# Patient Record
Sex: MALE | Race: BLACK OR AFRICAN AMERICAN | Employment: FULL TIME | URBAN - METROPOLITAN AREA
[De-identification: names, ages, dates, MRNs, and addresses within clinical notes are randomized per-mention and may not be internally consistent; named-entity substitution may affect disease eponyms.]

---

## 2024-08-19 ENCOUNTER — APPOINTMENT (OUTPATIENT)
Dept: GENERAL RADIOLOGY | Age: 59
DRG: 305 | End: 2024-08-19
Payer: COMMERCIAL

## 2024-08-19 ENCOUNTER — HOSPITAL ENCOUNTER (INPATIENT)
Age: 59
LOS: 2 days | Discharge: HOME | DRG: 305 | End: 2024-08-21
Attending: EMERGENCY MEDICINE | Admitting: STUDENT IN AN ORGANIZED HEALTH CARE EDUCATION/TRAINING PROGRAM
Payer: COMMERCIAL

## 2024-08-19 ENCOUNTER — APPOINTMENT (OUTPATIENT)
Dept: CT IMAGING | Age: 59
DRG: 305 | End: 2024-08-19
Payer: COMMERCIAL

## 2024-08-19 DIAGNOSIS — I16.1 HYPERTENSIVE EMERGENCY: ICD-10-CM

## 2024-08-19 DIAGNOSIS — N17.9 ACUTE KIDNEY INJURY SUPERIMPOSED ON CKD (HCC): Primary | ICD-10-CM

## 2024-08-19 DIAGNOSIS — N18.9 ACUTE KIDNEY INJURY SUPERIMPOSED ON CKD (HCC): Primary | ICD-10-CM

## 2024-08-19 LAB
ALBUMIN SERPL-MCNC: 4.3 G/DL (ref 3.5–4.6)
ALP SERPL-CCNC: 107 U/L (ref 35–104)
ALT SERPL-CCNC: 31 U/L (ref 0–41)
ANION GAP SERPL CALCULATED.3IONS-SCNC: 12 MEQ/L (ref 9–15)
ANION GAP SERPL CALCULATED.3IONS-SCNC: 14 MEQ/L (ref 9–15)
APTT PPP: 27.5 SEC (ref 24.4–36.8)
AST SERPL-CCNC: 45 U/L (ref 0–40)
BACTERIA URNS QL MICRO: NEGATIVE /HPF
BASOPHILS # BLD: 0 K/UL (ref 0–0.2)
BASOPHILS NFR BLD: 0.6 %
BILIRUB SERPL-MCNC: 1.1 MG/DL (ref 0.2–0.7)
BILIRUB UR QL STRIP: NEGATIVE
BNP BLD-MCNC: 83 PG/ML
BUN SERPL-MCNC: 29 MG/DL (ref 8–23)
BUN SERPL-MCNC: 31 MG/DL (ref 8–23)
CALCIUM SERPL-MCNC: 9.1 MG/DL (ref 8.5–9.9)
CALCIUM SERPL-MCNC: 9.3 MG/DL (ref 8.5–9.9)
CHLORIDE SERPL-SCNC: 103 MEQ/L (ref 95–107)
CHLORIDE SERPL-SCNC: 98 MEQ/L (ref 95–107)
CLARITY UR: CLEAR
CO2 SERPL-SCNC: 26 MEQ/L (ref 20–31)
CO2 SERPL-SCNC: 32 MEQ/L (ref 20–31)
COLOR UR: YELLOW
CREAT SERPL-MCNC: 2.45 MG/DL (ref 0.7–1.2)
CREAT SERPL-MCNC: 2.47 MG/DL (ref 0.7–1.2)
EKG ATRIAL RATE: 85 BPM
EKG P AXIS: 64 DEGREES
EKG P-R INTERVAL: 158 MS
EKG Q-T INTERVAL: 382 MS
EKG QRS DURATION: 88 MS
EKG QTC CALCULATION (BAZETT): 454 MS
EKG R AXIS: -24 DEGREES
EKG T AXIS: 30 DEGREES
EKG VENTRICULAR RATE: 85 BPM
EOSINOPHIL # BLD: 0.6 K/UL (ref 0–0.7)
EOSINOPHIL NFR BLD: 8.8 %
EPI CELLS #/AREA URNS HPF: NORMAL /HPF
ERYTHROCYTE [DISTWIDTH] IN BLOOD BY AUTOMATED COUNT: 12.9 % (ref 11.5–14.5)
GLOBULIN SER CALC-MCNC: 3 G/DL (ref 2.3–3.5)
GLUCOSE BLD-MCNC: 131 MG/DL (ref 70–99)
GLUCOSE SERPL-MCNC: 101 MG/DL (ref 70–99)
GLUCOSE SERPL-MCNC: 115 MG/DL (ref 70–99)
GLUCOSE UR STRIP-MCNC: NEGATIVE MG/DL
HCT VFR BLD AUTO: 43.9 % (ref 42–52)
HGB BLD-MCNC: 15.1 G/DL (ref 14–18)
HGB UR QL STRIP: NEGATIVE
INR PPP: 1.1
KETONES UR STRIP-MCNC: NEGATIVE MG/DL
LEUKOCYTE ESTERASE UR QL STRIP: NEGATIVE
LYMPHOCYTES # BLD: 2.1 K/UL (ref 1–4.8)
LYMPHOCYTES NFR BLD: 30.6 %
MAGNESIUM SERPL-MCNC: 2.1 MG/DL (ref 1.7–2.4)
MAGNESIUM SERPL-MCNC: 2.2 MG/DL (ref 1.7–2.4)
MCH RBC QN AUTO: 30.1 PG (ref 27–31.3)
MCHC RBC AUTO-ENTMCNC: 34.4 % (ref 33–37)
MCV RBC AUTO: 87.5 FL (ref 79–92.2)
MONOCYTES # BLD: 0.5 K/UL (ref 0.2–0.8)
MONOCYTES NFR BLD: 7.6 %
NEUTROPHILS # BLD: 3.6 K/UL (ref 1.4–6.5)
NEUTS SEG NFR BLD: 52.3 %
NITRITE UR QL STRIP: NEGATIVE
PERFORMED ON: ABNORMAL
PH UR STRIP: 6 [PH] (ref 5–9)
PLATELET # BLD AUTO: 197 K/UL (ref 130–400)
POTASSIUM SERPL-SCNC: 3 MEQ/L (ref 3.4–4.9)
POTASSIUM SERPL-SCNC: 3.1 MEQ/L (ref 3.4–4.9)
PROT SERPL-MCNC: 7.3 G/DL (ref 6.3–8)
PROT UR STRIP-MCNC: 100 MG/DL
PROTHROMBIN TIME: 13.9 SEC (ref 12.3–14.9)
RBC # BLD AUTO: 5.02 M/UL (ref 4.7–6.1)
RBC #/AREA URNS HPF: NORMAL /HPF (ref 0–2)
SODIUM SERPL-SCNC: 142 MEQ/L (ref 135–144)
SODIUM SERPL-SCNC: 143 MEQ/L (ref 135–144)
SP GR UR STRIP: 1.01 (ref 1–1.03)
TROPONIN, HIGH SENSITIVITY: 16 NG/L (ref 0–19)
TROPONIN, HIGH SENSITIVITY: 17 NG/L (ref 0–19)
TROPONIN, HIGH SENSITIVITY: 17 NG/L (ref 0–19)
URINE REFLEX TO CULTURE: ABNORMAL
UROBILINOGEN UR STRIP-ACNC: 1 E.U./DL
WBC # BLD AUTO: 6.8 K/UL (ref 4.8–10.8)
WBC #/AREA URNS HPF: NORMAL /HPF (ref 0–5)

## 2024-08-19 PROCEDURE — 96374 THER/PROPH/DIAG INJ IV PUSH: CPT

## 2024-08-19 PROCEDURE — 84484 ASSAY OF TROPONIN QUANT: CPT

## 2024-08-19 PROCEDURE — 93005 ELECTROCARDIOGRAM TRACING: CPT | Performed by: INTERNAL MEDICINE

## 2024-08-19 PROCEDURE — 85610 PROTHROMBIN TIME: CPT

## 2024-08-19 PROCEDURE — 71045 X-RAY EXAM CHEST 1 VIEW: CPT

## 2024-08-19 PROCEDURE — 85730 THROMBOPLASTIN TIME PARTIAL: CPT

## 2024-08-19 PROCEDURE — 6370000000 HC RX 637 (ALT 250 FOR IP): Performed by: STUDENT IN AN ORGANIZED HEALTH CARE EDUCATION/TRAINING PROGRAM

## 2024-08-19 PROCEDURE — 93010 ELECTROCARDIOGRAM REPORT: CPT | Performed by: INTERNAL MEDICINE

## 2024-08-19 PROCEDURE — 96375 TX/PRO/DX INJ NEW DRUG ADDON: CPT

## 2024-08-19 PROCEDURE — 36415 COLL VENOUS BLD VENIPUNCTURE: CPT

## 2024-08-19 PROCEDURE — 83735 ASSAY OF MAGNESIUM: CPT

## 2024-08-19 PROCEDURE — 81001 URINALYSIS AUTO W/SCOPE: CPT

## 2024-08-19 PROCEDURE — 6360000002 HC RX W HCPCS: Performed by: EMERGENCY MEDICINE

## 2024-08-19 PROCEDURE — 70450 CT HEAD/BRAIN W/O DYE: CPT

## 2024-08-19 PROCEDURE — 6370000000 HC RX 637 (ALT 250 FOR IP): Performed by: NURSE PRACTITIONER

## 2024-08-19 PROCEDURE — 6370000000 HC RX 637 (ALT 250 FOR IP): Performed by: EMERGENCY MEDICINE

## 2024-08-19 PROCEDURE — 85025 COMPLETE CBC W/AUTO DIFF WBC: CPT

## 2024-08-19 PROCEDURE — 2580000003 HC RX 258: Performed by: EMERGENCY MEDICINE

## 2024-08-19 PROCEDURE — 99285 EMERGENCY DEPT VISIT HI MDM: CPT

## 2024-08-19 PROCEDURE — 83880 ASSAY OF NATRIURETIC PEPTIDE: CPT

## 2024-08-19 PROCEDURE — 2000000000 HC ICU R&B

## 2024-08-19 PROCEDURE — 80053 COMPREHEN METABOLIC PANEL: CPT

## 2024-08-19 PROCEDURE — 93005 ELECTROCARDIOGRAM TRACING: CPT | Performed by: EMERGENCY MEDICINE

## 2024-08-19 PROCEDURE — 99291 CRITICAL CARE FIRST HOUR: CPT | Performed by: INTERNAL MEDICINE

## 2024-08-19 RX ORDER — SODIUM CHLORIDE 0.9 % (FLUSH) 0.9 %
5-40 SYRINGE (ML) INJECTION EVERY 12 HOURS SCHEDULED
Status: DISCONTINUED | OUTPATIENT
Start: 2024-08-19 | End: 2024-08-21 | Stop reason: HOSPADM

## 2024-08-19 RX ORDER — LOSARTAN POTASSIUM 50 MG/1
25 TABLET ORAL DAILY
Status: DISCONTINUED | OUTPATIENT
Start: 2024-08-19 | End: 2024-08-19

## 2024-08-19 RX ORDER — CHLORTHALIDONE 25 MG/1
12.5 TABLET ORAL DAILY
Status: DISCONTINUED | OUTPATIENT
Start: 2024-08-19 | End: 2024-08-21 | Stop reason: HOSPADM

## 2024-08-19 RX ORDER — ENOXAPARIN SODIUM 100 MG/ML
40 INJECTION SUBCUTANEOUS DAILY
Status: DISCONTINUED | OUTPATIENT
Start: 2024-08-19 | End: 2024-08-21 | Stop reason: HOSPADM

## 2024-08-19 RX ORDER — POTASSIUM CHLORIDE 7.45 MG/ML
10 INJECTION INTRAVENOUS PRN
Status: DISCONTINUED | OUTPATIENT
Start: 2024-08-19 | End: 2024-08-21 | Stop reason: HOSPADM

## 2024-08-19 RX ORDER — MAGNESIUM SULFATE IN WATER 40 MG/ML
2000 INJECTION, SOLUTION INTRAVENOUS PRN
Status: DISCONTINUED | OUTPATIENT
Start: 2024-08-19 | End: 2024-08-21 | Stop reason: HOSPADM

## 2024-08-19 RX ORDER — POTASSIUM CHLORIDE 20 MEQ/1
20 TABLET, EXTENDED RELEASE ORAL DAILY
COMMUNITY

## 2024-08-19 RX ORDER — HYDRALAZINE HYDROCHLORIDE 20 MG/ML
20 INJECTION INTRAMUSCULAR; INTRAVENOUS EVERY 4 HOURS PRN
Status: DISCONTINUED | OUTPATIENT
Start: 2024-08-19 | End: 2024-08-21 | Stop reason: HOSPADM

## 2024-08-19 RX ORDER — LABETALOL HYDROCHLORIDE 5 MG/ML
20 INJECTION, SOLUTION INTRAVENOUS EVERY 4 HOURS PRN
Status: DISCONTINUED | OUTPATIENT
Start: 2024-08-19 | End: 2024-08-20

## 2024-08-19 RX ORDER — HYDRALAZINE HYDROCHLORIDE 20 MG/ML
5 INJECTION INTRAMUSCULAR; INTRAVENOUS ONCE
Status: DISCONTINUED | OUTPATIENT
Start: 2024-08-19 | End: 2024-08-19

## 2024-08-19 RX ORDER — SODIUM CHLORIDE 0.9 % (FLUSH) 0.9 %
5-40 SYRINGE (ML) INJECTION PRN
Status: DISCONTINUED | OUTPATIENT
Start: 2024-08-19 | End: 2024-08-21 | Stop reason: HOSPADM

## 2024-08-19 RX ORDER — LOSARTAN POTASSIUM 50 MG/1
50 TABLET ORAL DAILY
Status: DISCONTINUED | OUTPATIENT
Start: 2024-08-20 | End: 2024-08-21 | Stop reason: HOSPADM

## 2024-08-19 RX ORDER — ONDANSETRON 2 MG/ML
4 INJECTION INTRAMUSCULAR; INTRAVENOUS EVERY 6 HOURS PRN
Status: DISCONTINUED | OUTPATIENT
Start: 2024-08-19 | End: 2024-08-21 | Stop reason: HOSPADM

## 2024-08-19 RX ORDER — LOSARTAN POTASSIUM 50 MG/1
25 TABLET ORAL ONCE
Status: COMPLETED | OUTPATIENT
Start: 2024-08-19 | End: 2024-08-19

## 2024-08-19 RX ORDER — HYDROCHLOROTHIAZIDE 12.5 MG/1
12.5 TABLET ORAL DAILY
Status: ON HOLD | COMMUNITY
End: 2024-08-19

## 2024-08-19 RX ORDER — SODIUM CHLORIDE 9 MG/ML
INJECTION, SOLUTION INTRAVENOUS PRN
Status: DISCONTINUED | OUTPATIENT
Start: 2024-08-19 | End: 2024-08-21 | Stop reason: HOSPADM

## 2024-08-19 RX ORDER — CHLORTHALIDONE 25 MG/1
12.5 TABLET ORAL DAILY
COMMUNITY

## 2024-08-19 RX ORDER — TRAZODONE HYDROCHLORIDE 50 MG/1
50 TABLET ORAL NIGHTLY
COMMUNITY

## 2024-08-19 RX ORDER — ACETAMINOPHEN 650 MG/1
650 SUPPOSITORY RECTAL EVERY 6 HOURS PRN
Status: DISCONTINUED | OUTPATIENT
Start: 2024-08-19 | End: 2024-08-21 | Stop reason: HOSPADM

## 2024-08-19 RX ORDER — LOSARTAN POTASSIUM 25 MG/1
25 TABLET ORAL DAILY
Status: ON HOLD | COMMUNITY
End: 2024-08-21 | Stop reason: HOSPADM

## 2024-08-19 RX ORDER — HYDRALAZINE HYDROCHLORIDE 20 MG/ML
10 INJECTION INTRAMUSCULAR; INTRAVENOUS ONCE
Status: COMPLETED | OUTPATIENT
Start: 2024-08-19 | End: 2024-08-19

## 2024-08-19 RX ORDER — ONDANSETRON 4 MG/1
4 TABLET, ORALLY DISINTEGRATING ORAL EVERY 8 HOURS PRN
Status: DISCONTINUED | OUTPATIENT
Start: 2024-08-19 | End: 2024-08-21 | Stop reason: HOSPADM

## 2024-08-19 RX ORDER — TRAZODONE HYDROCHLORIDE 50 MG/1
50 TABLET ORAL NIGHTLY
Status: DISCONTINUED | OUTPATIENT
Start: 2024-08-19 | End: 2024-08-21 | Stop reason: HOSPADM

## 2024-08-19 RX ORDER — POLYETHYLENE GLYCOL 3350 17 G/17G
17 POWDER, FOR SOLUTION ORAL DAILY PRN
Status: DISCONTINUED | OUTPATIENT
Start: 2024-08-19 | End: 2024-08-21 | Stop reason: HOSPADM

## 2024-08-19 RX ORDER — AMLODIPINE BESYLATE 10 MG/1
10 TABLET ORAL DAILY
Status: DISCONTINUED | OUTPATIENT
Start: 2024-08-19 | End: 2024-08-20

## 2024-08-19 RX ORDER — ACETAMINOPHEN 325 MG/1
650 TABLET ORAL EVERY 6 HOURS PRN
Status: DISCONTINUED | OUTPATIENT
Start: 2024-08-19 | End: 2024-08-21 | Stop reason: HOSPADM

## 2024-08-19 RX ORDER — LABETALOL HYDROCHLORIDE 5 MG/ML
20 INJECTION, SOLUTION INTRAVENOUS ONCE
Status: COMPLETED | OUTPATIENT
Start: 2024-08-19 | End: 2024-08-19

## 2024-08-19 RX ORDER — POTASSIUM CHLORIDE 20 MEQ/1
40 TABLET, EXTENDED RELEASE ORAL PRN
Status: DISCONTINUED | OUTPATIENT
Start: 2024-08-19 | End: 2024-08-21 | Stop reason: HOSPADM

## 2024-08-19 RX ADMIN — LOSARTAN POTASSIUM 25 MG: 50 TABLET, FILM COATED ORAL at 14:54

## 2024-08-19 RX ADMIN — LABETALOL HYDROCHLORIDE 20 MG: 5 INJECTION, SOLUTION INTRAVENOUS at 04:42

## 2024-08-19 RX ADMIN — NICARDIPINE HYDROCHLORIDE 2.5 MG/HR: 25 INJECTION, SOLUTION INTRAVENOUS at 22:12

## 2024-08-19 RX ADMIN — TRAZODONE HYDROCHLORIDE 50 MG: 50 TABLET ORAL at 20:57

## 2024-08-19 RX ADMIN — LOSARTAN POTASSIUM 25 MG: 50 TABLET, FILM COATED ORAL at 10:50

## 2024-08-19 RX ADMIN — HYDRALAZINE HYDROCHLORIDE 10 MG: 20 INJECTION INTRAMUSCULAR; INTRAVENOUS at 06:03

## 2024-08-19 RX ADMIN — NICARDIPINE HYDROCHLORIDE 7.5 MG/HR: 25 INJECTION, SOLUTION INTRAVENOUS at 07:30

## 2024-08-19 RX ADMIN — NICARDIPINE HYDROCHLORIDE 7.5 MG/HR: 25 INJECTION, SOLUTION INTRAVENOUS at 15:52

## 2024-08-19 RX ADMIN — POTASSIUM BICARBONATE 25 MEQ: 978 TABLET, EFFERVESCENT ORAL at 06:03

## 2024-08-19 RX ADMIN — CHLORTHALIDONE 12.5 MG: 25 TABLET ORAL at 10:51

## 2024-08-19 RX ADMIN — POTASSIUM BICARBONATE 50 MEQ: 978 TABLET, EFFERVESCENT ORAL at 18:53

## 2024-08-19 RX ADMIN — AMLODIPINE BESYLATE 10 MG: 10 TABLET ORAL at 15:29

## 2024-08-19 RX ADMIN — NICARDIPINE HYDROCHLORIDE 5 MG/HR: 25 INJECTION, SOLUTION INTRAVENOUS at 07:02

## 2024-08-19 RX ADMIN — POTASSIUM BICARBONATE 50 MEQ: 978 TABLET, EFFERVESCENT ORAL at 14:54

## 2024-08-19 RX ADMIN — NICARDIPINE HYDROCHLORIDE 10 MG/HR: 25 INJECTION, SOLUTION INTRAVENOUS at 08:04

## 2024-08-19 RX ADMIN — HYDRALAZINE HYDROCHLORIDE 10 MG: 20 INJECTION INTRAMUSCULAR; INTRAVENOUS at 06:54

## 2024-08-19 ASSESSMENT — LIFESTYLE VARIABLES
HOW OFTEN DO YOU HAVE A DRINK CONTAINING ALCOHOL: NEVER
HOW MANY STANDARD DRINKS CONTAINING ALCOHOL DO YOU HAVE ON A TYPICAL DAY: PATIENT DOES NOT DRINK

## 2024-08-19 ASSESSMENT — ENCOUNTER SYMPTOMS
VOMITING: 0
PHOTOPHOBIA: 0
ABDOMINAL PAIN: 0
SHORTNESS OF BREATH: 0

## 2024-08-19 ASSESSMENT — PAIN - FUNCTIONAL ASSESSMENT: PAIN_FUNCTIONAL_ASSESSMENT: NONE - DENIES PAIN

## 2024-08-19 NOTE — ED PROVIDER NOTES
08/19/24 0646   BP: (!) 211/117 (!) 211/117 (!) 201/110 (!) 218/116   Pulse: 76      Resp: 25      Temp:       TempSrc:       SpO2: 98%      Weight:       Height:           Cardiac biomarkers not consistent with acute ischemia  No clinical signs of CHF  NIH 0, clinical exam not consistent with CVA.  UA consistent with proteinuria  Potassium repleted  Medical Decision Making  Amount and/or Complexity of Data Reviewed  Labs: ordered.  Radiology: ordered.  ECG/medicine tests: ordered.    Risk  Prescription drug management.  Decision regarding hospitalization.    Patient presents emergency department with symptomatic hypertension, initial blood pressure significantly elevated.  IV obtained, EKG obtained, labs sent, CT ordered to rule out ICH.  Given IV labetalol, will reevaluate.  He reports he has been compliant with Cozaar 25 mg and chlorthalidone 12.5 mg for the past week.  His creatinine was 1.47 in 10/23, 1.9 10 days ago and 2.47 today  Subsequently given IV hydralazine given his current heart rate  Will be admitted to the hospital for further blood pressure monitoring, management, possible nephrology consultation, patient needs to be optimized on his oral outpatient medications  No ICH    dr Rodríguez accepts    CONSULTS:  None    PROCEDURES:  Unless otherwise noted below, none     Procedures    Critical care time 30 minutes.  High likelihood of clinical deterioration, frequent rechecks, hypotension requiring IV titratable medication    FINAL IMPRESSION      1. Acute kidney injury superimposed on CKD (HCC)    2. Hypertensive emergency          DISPOSITION/PLAN   DISPOSITION Decision To Admit 08/19/2024 05:46:08 AM  Condition at Disposition: Stable      PATIENT REFERRED TO:  No follow-up provider specified.    DISCHARGE MEDICATIONS:  New Prescriptions    No medications on file     Controlled Substances Monitoring:          No data to display                (Please note that portions of this note were completed with a

## 2024-08-19 NOTE — CARE COORDINATION
Case Management Assessment  Initial Evaluation    Date/Time of Evaluation: 8/19/2024 10:31 AM  Assessment Completed by: FABRICIO Costa    If patient is discharged prior to next notation, then this note serves as note for discharge by case management.    Patient Name: Presley Hernandez                   YOB: 1965  Diagnosis: Hypertensive emergency [I16.1]  Acute kidney injury superimposed on CKD (HCC) [N17.9, N18.9]                   Date / Time: 8/19/2024  4:24 AM    Patient Admission Status: Inpatient   Readmission Risk (Low < 19, Mod (19-27), High > 27): Readmission Risk Score: 9.6    Current PCP: Tricia Ansari APRN - CNP  PCP verified by CM? Yes    Chart Reviewed: Yes      History Provided by: Patient  Patient Orientation: Alert and Oriented    Patient Cognition: Alert    Hospitalization in the last 30 days (Readmission):  No    If yes, Readmission Assessment in CM Navigator will be completed.    Advance Directives:      Code Status: Not on file   Patient's Primary Decision Maker is: Legal Next of Kin    Primary Decision Maker: JENNIFER LAW - [facility] - [phone]    Discharge Planning:    Patient lives with:   Type of Home:    Primary Care Giver: Self  Patient Support Systems include: Spouse/Significant Other   Current Financial resources: Medicare  Current community resources: None  Current services prior to admission:              Current DME:              Type of Home Care services:       ADLS  Prior functional level: Independent in ADLs/IADLs  Current functional level: Independent in ADLs/IADLs    PT AM-PAC:   /24  OT AM-PAC:   /24    Family can provide assistance at DC: Yes  Would you like Case Management to discuss the discharge plan with any other family members/significant others, and if so, who? Yes (WIFE)  Plans to Return to Present Housing: Yes  Other Identified Issues/Barriers to RETURNING to current housing: MEDICAL CLEARANCE  Potential Assistance needed at discharge:

## 2024-08-19 NOTE — H&P
DEPARTMENT OF HOSPITAL MEDICINE    HISTORY AND PHYSICAL    PATIENT NAME:  Presley Hernandez    MRN:  [id number]  SERVICE DATE:  8/19/2024   SERVICE TIME:  7:01 AM    Primary Care Physician: Tricia Ansari APRN - CNP     SUBJECTIVE  CHIEF COMPLAINT:    Chief Complaint   Patient presents with    Dizziness     Pt has been having issues with HTN, feeling dizzy and feeling strange     HPI:  This is a 59 y.o. male with PMH of uncontrolled HTN who presents with dizziness and chest pressure.  Patient works night shift and states he was at work this morning and began to feel dizzy and lightheaded.  He checked his blood pressure at this time noted that it was high and so presented to the ED for further evaluation.  Since presented to the ED he also reports some mild chest pressure that has improved with improvement in his BP.  He reports recently seeing his PCP and was placed back on blood pressure medicine at this time.  He reports taking his medications as prescribed.  He denies fevers, chills, shortness of breath, abdominal pain, N/V.    PAST MEDICAL HISTORY:    Past Medical History:   Diagnosis Date    Hypertension      PAST SURGICAL HISTORY:  History reviewed. No pertinent surgical history.  FAMILY HISTORY:  History reviewed. No pertinent family history.  SOCIAL HISTORY:    Social History     Socioeconomic History    Marital status:      Spouse name: Not on file    Number of children: Not on file    Years of education: Not on file    Highest education level: Not on file   Occupational History    Not on file   Tobacco Use    Smoking status: Never    Smokeless tobacco: Never   Vaping Use    Vaping status: Never Used   Substance and Sexual Activity    Alcohol use: Never    Drug use: Not on file    Sexual activity: Not on file   Other Topics Concern    Not on file   Social History Narrative    Not on file     Social Determinants of Health     Financial Resource Strain: Not on file   Food Insecurity: Food Insecurity

## 2024-08-19 NOTE — CONSULTS
Pulmonary and Critical Care Medicine  Consult Note  Encounter Date: 2024 10:26 AM    Mr. Presley Hernandez is a 59 y.o. male  : 1965  Requesting Provider: Zelda Rodríguez MD   Reason for request: ICU management             HISTORY OF PRESENT ILLNESS:    Presley Hernandez is a 59 y.o., male who has a past medical history of  has a past medical history of Hypertension. that is hospitalized for hypertension     HPI   Patient states he was at work and felt dizziness. He went to lunch and was still dizzy and started having pressure in his head. He states his primary doctor changed around his medications about one week ago. He brought himself into the ER. Blood pressure on arrival was 213/111, he was given IV hydralazine and Labetalol. Blood pressure still remained elevated. He was started on a Cardene drip and sent to the ICU. Blood pressure is 175/72 on 10 mg/hr of Cardene. He was also having chest pressure when he arrived to the ICU but states that is better.    Past Medical History:        Diagnosis Date    Hypertension        Past Surgical History:    History reviewed. No pertinent surgical history.    Social History:     reports that he has never smoked. He has never used smokeless tobacco. He reports that he does not drink alcohol.    Family History:   History reviewed. No pertinent family history.    Allergies:  Patient has no known allergies.    MEDICATIONS during current hospitalization:    Continuous Infusions:   niCARdipine 10 mg/hr (24 0804)       Scheduled Meds:   losartan  25 mg Oral Daily    chlorthalidone  12.5 mg Oral Daily       PRN Meds:    REVIEW OF SYSTEMS:      Review of Systems    PHYSICAL EXAM:    Vitals:  BP (!) 170/76   Pulse 97   Temp 98.2 °F (36.8 °C) (Oral)   Resp 17   Ht 1.778 m (5' 10\")   Wt 85.3 kg (188 lb)   SpO2 100%   BMI 26.98 kg/m²     General: alert, cooperative  Head: normocephalic, atraumatic  Eyes:No gross abnormalities.  ENT:  MMM no lesions  Neck:  supple

## 2024-08-19 NOTE — ACP (ADVANCE CARE PLANNING)
Advance Care Planning   Healthcare Decision Maker:    Primary Decision Maker: JENNIFER LAW - [facility] - [phone]    Click here to complete Healthcare Decision Makers including selection of the Healthcare Decision Maker Relationship (ie \"Primary\").  Today we documented Decision Maker(s) consistent with Legal Next of Kin hierarchy.       Electronically signed by FABRICIO Costa on 8/19/2024 at 10:30 AM

## 2024-08-19 NOTE — INTERDISCIPLINARY ROUNDS
Spiritual Health Assessment/Progress Note  [facility]    Interdisciplinary rounds, Initial Encounter,  ,  ,      Name: Presley Hernandez MRN: [id number]    Age: 59 y.o.     Sex: male   Language: English   Moravian: Islam   Hypertensive emergency     Date: 8/19/2024            Total Time Calculated: 20 min     Pt. With family welcomed  and conveyed signs of worry/fear.  asked about spiritual community. Pt is Islam and related how Jehovah's witness brings him peace.  discussed the importance of prayer and how that can be a comforting/coping mechanism during hospital stay. Pt requested Quran and  will provide one.           Spiritual Assessment began in [facility] ICU        Referral/Consult From: Rounding   Encounter Overview/Reason: Interdisciplinary rounds, Initial Encounter  Service Provided For: Patient    Mili, Belief, Meaning:   Patient identifies as spiritual, is connected with a mili tradition or spiritual practice, and has beliefs or practices that help with coping during difficult times  Family/Friends identify as spiritual, are connected with a mili tradition or spiritual practice, and have beliefs or practices that help with coping during difficult times      Importance and Influence:  Patient has spiritual/personal beliefs that influence decisions regarding their health  Family/Friends have spiritual/personal beliefs that influence decisions regarding the patient's health    Community:  Patient is connected with a spiritual community and feels well-supported. Support system includes: Children and Mili Community  Family/Friends are connected with a spiritual community: and feel well-supported. Support system includes: Mili Community and Extended family    Assessment and Plan of Care:     Patient Interventions include: Facilitated expression of thoughts and feelings and Explored spiritual coping/struggle/distress and theological reflection  Family/Friends Interventions

## 2024-08-20 ENCOUNTER — TELEPHONE (OUTPATIENT)
Dept: PULMONOLOGY | Age: 59
End: 2024-08-20

## 2024-08-20 DIAGNOSIS — G47.30 SLEEP-DISORDERED BREATHING: Primary | ICD-10-CM

## 2024-08-20 LAB
ALBUMIN SERPL-MCNC: 4.1 G/DL (ref 3.5–4.6)
ANION GAP SERPL CALCULATED.3IONS-SCNC: 12 MEQ/L (ref 9–15)
ANION GAP SERPL CALCULATED.3IONS-SCNC: 13 MEQ/L (ref 9–15)
BASOPHILS # BLD: 0 K/UL (ref 0–0.2)
BASOPHILS NFR BLD: 0.5 %
BUN SERPL-MCNC: 25 MG/DL (ref 8–23)
BUN SERPL-MCNC: 26 MG/DL (ref 8–23)
CALCIUM SERPL-MCNC: 8.8 MG/DL (ref 8.5–9.9)
CALCIUM SERPL-MCNC: 9 MG/DL (ref 8.5–9.9)
CHLORIDE SERPL-SCNC: 100 MEQ/L (ref 95–107)
CHLORIDE SERPL-SCNC: 101 MEQ/L (ref 95–107)
CO2 SERPL-SCNC: 23 MEQ/L (ref 20–31)
CO2 SERPL-SCNC: 26 MEQ/L (ref 20–31)
CREAT SERPL-MCNC: 1.89 MG/DL (ref 0.7–1.2)
CREAT SERPL-MCNC: 2.08 MG/DL (ref 0.7–1.2)
EKG ATRIAL RATE: 103 BPM
EKG P AXIS: 65 DEGREES
EKG P-R INTERVAL: 166 MS
EKG Q-T INTERVAL: 394 MS
EKG QRS DURATION: 82 MS
EKG QTC CALCULATION (BAZETT): 516 MS
EKG R AXIS: 20 DEGREES
EKG T AXIS: 121 DEGREES
EKG VENTRICULAR RATE: 103 BPM
EOSINOPHIL # BLD: 0.3 K/UL (ref 0–0.7)
EOSINOPHIL NFR BLD: 3.4 %
ERYTHROCYTE [DISTWIDTH] IN BLOOD BY AUTOMATED COUNT: 13 % (ref 11.5–14.5)
GLUCOSE SERPL-MCNC: 100 MG/DL (ref 70–99)
GLUCOSE SERPL-MCNC: 112 MG/DL (ref 70–99)
HCT VFR BLD AUTO: 44.5 % (ref 42–52)
HGB BLD-MCNC: 15.6 G/DL (ref 14–18)
LYMPHOCYTES # BLD: 2 K/UL (ref 1–4.8)
LYMPHOCYTES NFR BLD: 23.4 %
MAGNESIUM SERPL-MCNC: 2 MG/DL (ref 1.7–2.4)
MAGNESIUM SERPL-MCNC: 2.3 MG/DL (ref 1.7–2.4)
MCH RBC QN AUTO: 29.8 PG (ref 27–31.3)
MCHC RBC AUTO-ENTMCNC: 35.1 % (ref 33–37)
MCV RBC AUTO: 85.1 FL (ref 79–92.2)
MONOCYTES # BLD: 0.5 K/UL (ref 0.2–0.8)
MONOCYTES NFR BLD: 6.3 %
NEUTROPHILS # BLD: 5.7 K/UL (ref 1.4–6.5)
NEUTS SEG NFR BLD: 66.2 %
PHOSPHATE SERPL-MCNC: 2.6 MG/DL (ref 2.3–4.8)
PLATELET # BLD AUTO: 204 K/UL (ref 130–400)
POTASSIUM SERPL-SCNC: 3.1 MEQ/L (ref 3.4–4.9)
POTASSIUM SERPL-SCNC: 3.1 MEQ/L (ref 3.4–4.9)
POTASSIUM SERPL-SCNC: 3.4 MEQ/L (ref 3.4–4.9)
RBC # BLD AUTO: 5.23 M/UL (ref 4.7–6.1)
SODIUM SERPL-SCNC: 135 MEQ/L (ref 135–144)
SODIUM SERPL-SCNC: 140 MEQ/L (ref 135–144)
WBC # BLD AUTO: 8.6 K/UL (ref 4.8–10.8)

## 2024-08-20 PROCEDURE — 85025 COMPLETE CBC W/AUTO DIFF WBC: CPT

## 2024-08-20 PROCEDURE — 2580000003 HC RX 258: Performed by: STUDENT IN AN ORGANIZED HEALTH CARE EDUCATION/TRAINING PROGRAM

## 2024-08-20 PROCEDURE — 6370000000 HC RX 637 (ALT 250 FOR IP): Performed by: NURSE PRACTITIONER

## 2024-08-20 PROCEDURE — 80069 RENAL FUNCTION PANEL: CPT

## 2024-08-20 PROCEDURE — 2580000003 HC RX 258: Performed by: EMERGENCY MEDICINE

## 2024-08-20 PROCEDURE — 99291 CRITICAL CARE FIRST HOUR: CPT | Performed by: INTERNAL MEDICINE

## 2024-08-20 PROCEDURE — 6360000002 HC RX W HCPCS: Performed by: NURSE PRACTITIONER

## 2024-08-20 PROCEDURE — 93010 ELECTROCARDIOGRAM REPORT: CPT | Performed by: INTERNAL MEDICINE

## 2024-08-20 PROCEDURE — 6360000002 HC RX W HCPCS: Performed by: EMERGENCY MEDICINE

## 2024-08-20 PROCEDURE — 1210000000 HC MED SURG R&B

## 2024-08-20 PROCEDURE — 83735 ASSAY OF MAGNESIUM: CPT

## 2024-08-20 PROCEDURE — 6370000000 HC RX 637 (ALT 250 FOR IP): Performed by: STUDENT IN AN ORGANIZED HEALTH CARE EDUCATION/TRAINING PROGRAM

## 2024-08-20 PROCEDURE — 36415 COLL VENOUS BLD VENIPUNCTURE: CPT

## 2024-08-20 RX ORDER — CARVEDILOL 6.25 MG/1
6.25 TABLET ORAL 2 TIMES DAILY WITH MEALS
Status: DISCONTINUED | OUTPATIENT
Start: 2024-08-20 | End: 2024-08-21 | Stop reason: HOSPADM

## 2024-08-20 RX ORDER — ACETAMINOPHEN 80 MG
TABLET,CHEWABLE ORAL ONCE
Status: COMPLETED | OUTPATIENT
Start: 2024-08-20 | End: 2024-08-20

## 2024-08-20 RX ORDER — LABETALOL HYDROCHLORIDE 5 MG/ML
20 INJECTION, SOLUTION INTRAVENOUS EVERY 4 HOURS PRN
Status: DISCONTINUED | OUTPATIENT
Start: 2024-08-20 | End: 2024-08-21 | Stop reason: HOSPADM

## 2024-08-20 RX ORDER — DOCUSATE SODIUM 100 MG/1
100 CAPSULE, LIQUID FILLED ORAL 2 TIMES DAILY
Status: DISCONTINUED | OUTPATIENT
Start: 2024-08-20 | End: 2024-08-21 | Stop reason: HOSPADM

## 2024-08-20 RX ORDER — NIFEDIPINE 30 MG/1
30 TABLET, EXTENDED RELEASE ORAL DAILY
Status: DISCONTINUED | OUTPATIENT
Start: 2024-08-21 | End: 2024-08-21 | Stop reason: HOSPADM

## 2024-08-20 RX ADMIN — DOCUSATE SODIUM 100 MG: 100 CAPSULE, LIQUID FILLED ORAL at 21:14

## 2024-08-20 RX ADMIN — DOCUSATE SODIUM 100 MG: 100 CAPSULE, LIQUID FILLED ORAL at 09:16

## 2024-08-20 RX ADMIN — LABETALOL HYDROCHLORIDE 20 MG: 5 INJECTION, SOLUTION INTRAVENOUS at 12:50

## 2024-08-20 RX ADMIN — NICARDIPINE HYDROCHLORIDE 7.5 MG/HR: 25 INJECTION, SOLUTION INTRAVENOUS at 04:48

## 2024-08-20 RX ADMIN — Medication: at 10:09

## 2024-08-20 RX ADMIN — TRAZODONE HYDROCHLORIDE 50 MG: 50 TABLET ORAL at 21:14

## 2024-08-20 RX ADMIN — HYDRALAZINE HYDROCHLORIDE 20 MG: 20 INJECTION INTRAMUSCULAR; INTRAVENOUS at 03:51

## 2024-08-20 RX ADMIN — METOPROLOL TARTRATE 12.5 MG: 25 TABLET, FILM COATED ORAL at 10:10

## 2024-08-20 RX ADMIN — LOSARTAN POTASSIUM 50 MG: 50 TABLET, FILM COATED ORAL at 09:08

## 2024-08-20 RX ADMIN — POTASSIUM BICARBONATE 50 MEQ: 978 TABLET, EFFERVESCENT ORAL at 09:12

## 2024-08-20 RX ADMIN — CHLORTHALIDONE 12.5 MG: 25 TABLET ORAL at 09:08

## 2024-08-20 RX ADMIN — SODIUM CHLORIDE, PRESERVATIVE FREE 10 ML: 5 INJECTION INTRAVENOUS at 21:14

## 2024-08-20 RX ADMIN — AMLODIPINE BESYLATE 10 MG: 10 TABLET ORAL at 09:08

## 2024-08-20 RX ADMIN — CARVEDILOL 6.25 MG: 6.25 TABLET, FILM COATED ORAL at 17:04

## 2024-08-20 RX ADMIN — HYDRALAZINE HYDROCHLORIDE 20 MG: 20 INJECTION INTRAMUSCULAR; INTRAVENOUS at 21:27

## 2024-08-20 NOTE — CARE COORDINATION
Quality round completed with care management team. Plan remain home with wife. Denies any needs at this time.     LSW/ CM will follow for DC needs.     Electronically signed by FABRICIO Costa on 8/20/2024 at 10:07 AM

## 2024-08-21 VITALS
HEART RATE: 102 BPM | SYSTOLIC BLOOD PRESSURE: 152 MMHG | DIASTOLIC BLOOD PRESSURE: 93 MMHG | TEMPERATURE: 98.4 F | BODY MASS INDEX: 26.92 KG/M2 | HEIGHT: 70 IN | WEIGHT: 188 LBS | OXYGEN SATURATION: 98 % | RESPIRATION RATE: 17 BRPM

## 2024-08-21 PROBLEM — I16.1 HYPERTENSIVE EMERGENCY: Status: RESOLVED | Noted: 2024-08-19 | Resolved: 2024-08-21

## 2024-08-21 LAB
ANION GAP SERPL CALCULATED.3IONS-SCNC: 11 MEQ/L (ref 9–15)
BUN SERPL-MCNC: 24 MG/DL (ref 8–23)
CALCIUM SERPL-MCNC: 8.9 MG/DL (ref 8.5–9.9)
CHLORIDE SERPL-SCNC: 100 MEQ/L (ref 95–107)
CO2 SERPL-SCNC: 26 MEQ/L (ref 20–31)
CREAT SERPL-MCNC: 2.04 MG/DL (ref 0.7–1.2)
GLUCOSE SERPL-MCNC: 106 MG/DL (ref 70–99)
MAGNESIUM SERPL-MCNC: 2 MG/DL (ref 1.7–2.4)
POTASSIUM SERPL-SCNC: 3.4 MEQ/L (ref 3.4–4.9)
SODIUM SERPL-SCNC: 137 MEQ/L (ref 135–144)

## 2024-08-21 PROCEDURE — 83735 ASSAY OF MAGNESIUM: CPT

## 2024-08-21 PROCEDURE — 6370000000 HC RX 637 (ALT 250 FOR IP): Performed by: NURSE PRACTITIONER

## 2024-08-21 PROCEDURE — 6370000000 HC RX 637 (ALT 250 FOR IP): Performed by: STUDENT IN AN ORGANIZED HEALTH CARE EDUCATION/TRAINING PROGRAM

## 2024-08-21 PROCEDURE — 99232 SBSQ HOSP IP/OBS MODERATE 35: CPT | Performed by: INTERNAL MEDICINE

## 2024-08-21 PROCEDURE — 2580000003 HC RX 258: Performed by: STUDENT IN AN ORGANIZED HEALTH CARE EDUCATION/TRAINING PROGRAM

## 2024-08-21 PROCEDURE — 6360000002 HC RX W HCPCS: Performed by: STUDENT IN AN ORGANIZED HEALTH CARE EDUCATION/TRAINING PROGRAM

## 2024-08-21 PROCEDURE — 80048 BASIC METABOLIC PNL TOTAL CA: CPT

## 2024-08-21 PROCEDURE — 36415 COLL VENOUS BLD VENIPUNCTURE: CPT

## 2024-08-21 RX ORDER — CARVEDILOL 12.5 MG/1
12.5 TABLET ORAL 2 TIMES DAILY WITH MEALS
Qty: 60 TABLET | Refills: 0 | Status: SHIPPED | OUTPATIENT
Start: 2024-08-21

## 2024-08-21 RX ORDER — LOSARTAN POTASSIUM 100 MG/1
100 TABLET ORAL DAILY
Qty: 30 TABLET | Refills: 0 | Status: SHIPPED | OUTPATIENT
Start: 2024-08-22

## 2024-08-21 RX ORDER — NIFEDIPINE 30 MG/1
30 TABLET, EXTENDED RELEASE ORAL DAILY
Qty: 30 TABLET | Refills: 0 | Status: SHIPPED | OUTPATIENT
Start: 2024-08-22

## 2024-08-21 RX ADMIN — NIFEDIPINE 30 MG: 30 TABLET, EXTENDED RELEASE ORAL at 08:05

## 2024-08-21 RX ADMIN — SODIUM CHLORIDE, PRESERVATIVE FREE 10 ML: 5 INJECTION INTRAVENOUS at 08:10

## 2024-08-21 RX ADMIN — CHLORTHALIDONE 12.5 MG: 25 TABLET ORAL at 08:04

## 2024-08-21 RX ADMIN — LOSARTAN POTASSIUM 50 MG: 50 TABLET, FILM COATED ORAL at 08:05

## 2024-08-21 RX ADMIN — DOCUSATE SODIUM 100 MG: 100 CAPSULE, LIQUID FILLED ORAL at 08:05

## 2024-08-21 RX ADMIN — ENOXAPARIN SODIUM 40 MG: 100 INJECTION SUBCUTANEOUS at 08:09

## 2024-08-21 RX ADMIN — CARVEDILOL 6.25 MG: 6.25 TABLET, FILM COATED ORAL at 08:05

## 2024-08-21 NOTE — DISCHARGE INSTRUCTIONS
Follow up with primary care physician in the next 7 days or sooner if needed. If you do not have a Primary care physician, please schedule an appointment with one. Please ask prior to discharge about a list of local providers.     Please return to ER or call 911 if you develop any significant signs or symptoms.    I may not have addressed all of your medical illnesses or the abnormal blood work or imaging therefore please ask your PCP to obtain [facility] record to follow up on all of the abnormal labs, imaging and findings that I have and have not addressed during your hospitalization.     Discharging you from the hospital does not mean that your medical care ends here and now. You may still need additional work up, investigation, monitoring, and treatment to be handled from this point on by outside providers including your PCP, Specialists and other healthcare providers.     For medication questions, contact your retail pharmacy and your PCP.    Your medical team at [facility] appreciates the opportunity to work with you to get well!    Zelda Rodríguez MD  11:39 AM

## 2024-08-21 NOTE — PLAN OF CARE
Problem: Discharge Planning  Goal: Discharge to home or other facility with appropriate resources  8/20/2024 2313 by Franko Rodriguez, RN  Outcome: Progressing  8/20/2024 1301 by Chey Kilpatrick, RN  Outcome: Progressing     Problem: Chronic Conditions and Co-morbidities  Goal: Patient's chronic conditions and co-morbidity symptoms are monitored and maintained or improved  8/20/2024 2313 by Franko Rodriguez RN  Outcome: Progressing  8/20/2024 1301 by Chey Kilpatrick, RN  Outcome: Progressing

## 2024-08-21 NOTE — PROGRESS NOTES
1155 Patient arrived to 4 west    1300 Agree with previous nurse assessment. Orientated to room. Denies pain or discomfort.   
Assessment completed. Aox4. Pt denies dizziness, chest pain, headache. Pt ambulatory to sink with nurse to brush teeth. Pt Provided wash cloths and soap to get cleaned up, linens changed.     0000: Reassessment completed. No changes.    0400: Reassessment completed.   
Hospitalist Progress Note      PCP: Tricia Ansari APRN - CNP    Date of Admission: 8/19/2024    Chief Complaint:    Chief Complaint   Patient presents with    Dizziness     Pt has been having issues with HTN, feeling dizzy and feeling strange     Subjective:  No acute events overnight. Pt transferred out of the ICU today. Denies chest pain or shortness of breath. Feeling well today.    Medications:  Reviewed    Infusion Medications    sodium chloride       Scheduled Medications    docusate sodium  100 mg Oral BID    carvedilol  6.25 mg Oral BID     [START ON 8/21/2024] NIFEdipine  30 mg Oral Daily    chlorthalidone  12.5 mg Oral Daily    sodium chloride flush  5-40 mL IntraVENous 2 times per day    enoxaparin  40 mg SubCUTAneous Daily    losartan  50 mg Oral Daily    traZODone  50 mg Oral Nightly     PRN Meds: labetalol, hydrALAZINE, sodium chloride flush, sodium chloride, potassium chloride **OR** potassium alternative oral replacement **OR** potassium chloride, magnesium sulfate, ondansetron **OR** ondansetron, polyethylene glycol, acetaminophen **OR** acetaminophen      Intake/Output Summary (Last 24 hours) at 8/20/2024 1523  Last data filed at 8/20/2024 0828  Gross per 24 hour   Intake 1489.04 ml   Output 1400 ml   Net 89.04 ml     Exam:    BP (!) 167/90   Pulse 82   Temp 98.6 °F (37 °C) (Oral)   Resp 18   Ht 1.778 m (5' 10\")   Wt 85.3 kg (188 lb)   SpO2 99%   BMI 26.98 kg/m²   Physical Exam  Cardiovascular:      Rate and Rhythm: Normal rate and regular rhythm.   Pulmonary:      Effort: Pulmonary effort is normal. No respiratory distress.   Abdominal:      Palpations: Abdomen is soft.      Tenderness: There is no abdominal tenderness.   Neurological:      Mental Status: He is alert and oriented to person, place, and time.   Psychiatric:         Mood and Affect: Mood normal.         Behavior: Behavior normal.       Labs:   Recent Labs     08/19/24  0441 08/20/24  0455   WBC 6.8 8.6   HGB 15.1 15.6 
INPATIENT PROGRESS NOTES    PATIENT NAME: Presley Hernandez  MRN: [id number]  SERVICE DATE:  August 21, 2024   SERVICE TIME:  1:46 PM      PRIMARY SERVICE: Pulmonary Disease    CHIEF COMPLAINTS: Pulmonary edema    INTERVAL HPI: Patient seen and examined at bedside, Interval Notes, orders reviewed. Nursing notes noted    Patient report doing good, no complaint, no chest pain, no shortness of breath, currently on room air saturation 98%    New information updated in the note today, rest of the examination did not change compared to yesterday.    Review of system:     GI Abdominal pain No  Skin Rash No    Social History     Tobacco Use    Smoking status: Never    Smokeless tobacco: Never   Substance Use Topics    Alcohol use: Never     History reviewed. No pertinent family history.      OBJECTIVE    Body mass index is 26.98 kg/m².    PHYSICAL EXAM:  Vitals:  BP (!) 152/93   Pulse (!) 102   Temp 98.4 °F (36.9 °C) (Oral)   Resp 17   Ht 1.778 m (5' 10\")   Wt 85.3 kg (188 lb)   SpO2 98%   BMI 26.98 kg/m²     General: alert, cooperative, no distress  Head: normocephalic, atraumatic  Eyes:No gross abnormalities.  ENT:  MMM no lesions  Neck:  supple and no masses  Chest : clear to auscultation bilaterally- no wheezes, rales or rhonchi, normal air movement, no respiratory distress  Heart:: Heart sounds are normal.  Regular rate and rhythm without murmur, gallop or rub.  ABD:  symmetric, soft, non-tender  Musculoskeletal : no cyanosis, no clubbing, and no edema  Neuro:  Grossly normal  Skin: No rashes or nodules noted.  Lymph node:  no cervical nodes  Urology: No Chan   Psychiatric: appropriate    DATA:   Recent Labs     08/19/24  0441 08/20/24  0455   WBC 6.8 8.6   HGB 15.1 15.6   HCT 43.9 44.5   MCV 87.5 85.1    204     Recent Labs     08/19/24  0441 08/19/24  1350 08/20/24  1429 08/21/24  0541      < > 135 137   K 3.1*   < > 3.4 3.4   CL 98   < > 100 100   CO2 32*   < > 23 26   BUN 31*   < > 26* 24* 
IV removed without complication. Pt tolerated well. Catheter appears intact, dressing applied. No drainage noted.     Discharge instructions provided to patient. Verbalized understanding of follow up appointments, diet, activity, medications and reasons to return to ED/call physician. All questions answered. Copy of discharge instructions provided. Denies further needs.    
Pt educated on signs and symptoms of medication reactions. Print outs given for new medications. Patient verbalized understanding, no further questions.       
Pt from ED into ICU 6, awake, alert, and oriented x 3. Pt unable to tell me home meds. Moves extremities x 4 without diff.   
Report called to 4wt to Mona guadalupe. Tele monitor applied and I called  Zoltan in monitor room for confirmation.   
of medical record, management of life support systems, review of data including imaging and labs, discussions with other team members, patient's family and physicians at least 31 minutes so far today.        Electronically signed by Pepe Rowell MD on 8/20/2024 at 10:39 AM

## 2024-08-21 NOTE — DISCHARGE INSTR - COC
Continuity of Care Form    Patient Name: Presley Hernandez   :  1965  MRN:  [id number]    Admit date:  2024  Discharge date:  ***    Code Status Order: Full Code   Advance Directives:   Advance Care Flowsheet Documentation             Admitting Physician:  Zelda Rodríguez MD  PCP: Tricia Ansari APRN - CNP    Discharging Nurse: ***  Discharging Hospital Unit/Room#: W486/W486-01  Discharging Unit Phone Number: ***    Emergency Contact:   Extended Emergency Contact Information  Primary Emergency Contact: JENNIFER LAW  Home Phone: [phone]  Mobile Phone: [phone]  Relation: Spouse  Preferred language: English   needed? No    Past Surgical History:  History reviewed. No pertinent surgical history.    Immunization History:   Immunization History   Administered Date(s) Administered    COVID-19, PFIZER PURPLE top, DILUTE for use, (age 12 y+), 30mcg/0.3mL 02/15/2022, 03/15/2022       Active Problems:  Patient Active Problem List   Diagnosis Code   (none) - all problems resolved or deleted       Isolation/Infection:   Isolation            No Isolation          Patient Infection Status       None to display            Nurse Assessment:  Last Vital Signs: BP (!) 152/93   Pulse (!) 102   Temp 98.4 °F (36.9 °C) (Oral)   Resp 17   Ht 1.778 m (5' 10\")   Wt 85.3 kg (188 lb)   SpO2 98%   BMI 26.98 kg/m²     Last documented pain score (0-10 scale):    Last Weight:   Wt Readings from Last 1 Encounters:   24 85.3 kg (188 lb)     Mental Status:  {IP PT MENTAL STATUS:}    IV Access:  { LEE IV ACCESS:498855219}    Nursing Mobility/ADLs:  Walking   {CHP DME ADLs:028390444}  Transfer  {CHP DME ADLs:757643045}  Bathing  {CHP DME ADLs:458718305}  Dressing  {CHP DME ADLs:826524590}  Toileting  {CHP DME ADLs:447804340}  Feeding  {CHP DME ADLs:897783156}  Med Admin  {CHP DME ADLs:502095771}  Med Delivery   { LEE MED Delivery:026582928}    Wound Care Documentation and Therapy: